# Patient Record
Sex: FEMALE | Race: WHITE | NOT HISPANIC OR LATINO | Employment: FULL TIME | URBAN - METROPOLITAN AREA
[De-identification: names, ages, dates, MRNs, and addresses within clinical notes are randomized per-mention and may not be internally consistent; named-entity substitution may affect disease eponyms.]

---

## 2019-01-30 ENCOUNTER — OFFICE VISIT (OUTPATIENT)
Dept: OBSTETRICS AND GYNECOLOGY | Facility: CLINIC | Age: 44
End: 2019-01-30

## 2019-01-30 VITALS
HEIGHT: 63 IN | BODY MASS INDEX: 20.38 KG/M2 | WEIGHT: 115 LBS | SYSTOLIC BLOOD PRESSURE: 121 MMHG | DIASTOLIC BLOOD PRESSURE: 77 MMHG | HEART RATE: 109 BPM

## 2019-01-30 DIAGNOSIS — Z72.0 TOBACCO USE: ICD-10-CM

## 2019-01-30 DIAGNOSIS — R87.810 CERVICAL HIGH RISK HPV (HUMAN PAPILLOMAVIRUS) TEST POSITIVE: Primary | ICD-10-CM

## 2019-01-30 PROCEDURE — 99203 OFFICE O/P NEW LOW 30 MIN: CPT | Performed by: OBSTETRICS & GYNECOLOGY

## 2019-01-30 RX ORDER — CHLORAL HYDRATE 500 MG
CAPSULE ORAL
COMMUNITY
End: 2019-12-23

## 2019-01-30 RX ORDER — ASPIRIN 81 MG/1
TABLET, DELAYED RELEASE ORAL
COMMUNITY
Start: 2019-01-11 | End: 2019-12-23

## 2019-01-30 NOTE — PATIENT INSTRUCTIONS
Steps to Quit Smoking  Smoking tobacco can be harmful to your health and can affect almost every organ in your body. Smoking puts you, and those around you, at risk for developing many serious chronic diseases. Quitting smoking is difficult, but it is one of the best things that you can do for your health. It is never too late to quit.  What are the benefits of quitting smoking?  When you quit smoking, you lower your risk of developing serious diseases and conditions, such as:  · Lung cancer or lung disease, such as COPD.  · Heart disease.  · Stroke.  · Heart attack.  · Infertility.  · Osteoporosis and bone fractures.    Additionally, symptoms such as coughing, wheezing, and shortness of breath may get better when you quit. You may also find that you get sick less often because your body is stronger at fighting off colds and infections. If you are pregnant, quitting smoking can help to reduce your chances of having a baby of low birth weight.  How do I get ready to quit?  When you decide to quit smoking, create a plan to make sure that you are successful. Before you quit:  · Pick a date to quit. Set a date within the next two weeks to give you time to prepare.  · Write down the reasons why you are quitting. Keep this list in places where you will see it often, such as on your bathroom mirror or in your car or wallet.  · Identify the people, places, things, and activities that make you want to smoke (triggers) and avoid them. Make sure to take these actions:  ? Throw away all cigarettes at home, at work, and in your car.  ? Throw away smoking accessories, such as ashtrays and lighters.  ? Clean your car and make sure to empty the ashtray.  ? Clean your home, including curtains and carpets.  · Tell your family, friends, and coworkers that you are quitting. Support from your loved ones can make quitting easier.  · Talk with your health care provider about your options for quitting smoking.  · Find out what  treatment options are covered by your health insurance.    What strategies can I use to quit smoking?  Talk with your healthcare provider about different strategies to quit smoking. Some strategies include:  · Quitting smoking altogether instead of gradually lessening how much you smoke over a period of time. Research shows that quitting “cold turkey” is more successful than gradually quitting.  · Attending in-person counseling to help you build problem-solving skills. You are more likely to have success in quitting if you attend several counseling sessions. Even short sessions of 10 minutes can be effective.  · Finding resources and support systems that can help you to quit smoking and remain smoke-free after you quit. These resources are most helpful when you use them often. They can include:  ? Online chats with a counselor.  ? Telephone quitlines.  ? Printed self-help materials.  ? Support groups or group counseling.  ? Text messaging programs.  ? Mobile phone applications.  · Taking medicines to help you quit smoking. (If you are pregnant or breastfeeding, talk with your health care provider first.) Some medicines contain nicotine and some do not. Both types of medicines help with cravings, but the medicines that include nicotine help to relieve withdrawal symptoms. Your health care provider may recommend:  ? Nicotine patches, gum, or lozenges.  ? Nicotine inhalers or sprays.  ? Non-nicotine medicine that is taken by mouth.    Talk with your health care provider about combining strategies, such as taking medicines while you are also receiving in-person counseling. Using these two strategies together makes you more likely to succeed in quitting than if you used either strategy on its own.  If you are pregnant or breastfeeding, talk with your health care provider about finding counseling or other support strategies to quit smoking. Do not take medicine to help you quit smoking unless told to do so by your health  care provider.  What things can I do to make it easier to quit?  Quitting smoking might feel overwhelming at first, but there is a lot that you can do to make it easier. Take these important actions:  · Reach out to your family and friends and ask that they support and encourage you during this time. Call telephone quitlines, reach out to support groups, or work with a counselor for support.  · Ask people who smoke to avoid smoking around you.  · Avoid places that trigger you to smoke, such as bars, parties, or smoke-break areas at work.  · Spend time around people who do not smoke.  · Lessen stress in your life, because stress can be a smoking trigger for some people. To lessen stress, try:  ? Exercising regularly.  ? Deep-breathing exercises.  ? Yoga.  ? Meditating.  ? Performing a body scan. This involves closing your eyes, scanning your body from head to toe, and noticing which parts of your body are particularly tense. Purposefully relax the muscles in those areas.  · Download or purchase mobile phone or tablet apps (applications) that can help you stick to your quit plan by providing reminders, tips, and encouragement. There are many free apps, such as QuitGuide from the CDC (Centers for Disease Control and Prevention). You can find other support for quitting smoking (smoking cessation) through smokefree.gov and other websites.    How will I feel when I quit smoking?  Within the first 24 hours of quitting smoking, you may start to feel some withdrawal symptoms. These symptoms are usually most noticeable 2-3 days after quitting, but they usually do not last beyond 2-3 weeks. Changes or symptoms that you might experience include:  · Mood swings.  · Restlessness, anxiety, or irritation.  · Difficulty concentrating.  · Dizziness.  · Strong cravings for sugary foods in addition to nicotine.  · Mild weight gain.  · Constipation.  · Nausea.  · Coughing or a sore throat.  · Changes in how your medicines work in your  body.  · A depressed mood.  · Difficulty sleeping (insomnia).    After the first 2-3 weeks of quitting, you may start to notice more positive results, such as:  · Improved sense of smell and taste.  · Decreased coughing and sore throat.  · Slower heart rate.  · Lower blood pressure.  · Clearer skin.  · The ability to breathe more easily.  · Fewer sick days.    Quitting smoking is very challenging for most people. Do not get discouraged if you are not successful the first time. Some people need to make many attempts to quit before they achieve long-term success. Do your best to stick to your quit plan, and talk with your health care provider if you have any questions or concerns.  This information is not intended to replace advice given to you by your health care provider. Make sure you discuss any questions you have with your health care provider.  Document Released: 12/12/2002 Document Revised: 08/15/2017 Document Reviewed: 05/03/2016  [employer] Interactive Patient Education © 2018 [employer]

## 2019-01-30 NOTE — PROGRESS NOTES
"Elisa Mcdaniel is a 43 y.o. female.     History of Present Illness     43 y.o.    Had Pap at PCP was NIL HPV +, 16/18 negative  Last pap for about 4 years  No prior history of abnormal   Hysterectomy -  - Dr. Brown     Past Medical History:   Diagnosis Date   • Hyperlipidemia      Past Surgical History:   Procedure Laterality Date   •  SECTION     • LAPAROSCOPIC ASSISTED VAGINAL HYSTERECTOMY SALPINGO OOPHORECTOMY       meds _ ASA 81 mg     No Known Allergies    Social History     Tobacco Use   • Smoking status: Current Some Day Smoker     Types: Cigarettes   • Smokeless tobacco: Never Used   Substance Use Topics   • Alcohol use: Yes     Comment: Socially   • Drug use: No     Family History   Problem Relation Age of Onset   • Breast cancer Cousin    • Ovarian cancer Neg Hx    • Uterine cancer Neg Hx    • Colon cancer Neg Hx    • Deep vein thrombosis Neg Hx    • Pulmonary embolism Neg Hx          Review of Systems   Constitutional: Negative for chills and fever.   Gastrointestinal: Negative for abdominal pain, constipation and diarrhea.   Genitourinary: Negative for pelvic pain, vaginal bleeding and vaginal discharge.   All other systems reviewed and are negative.      Objective    /77   Pulse 109   Ht 158.8 cm (62.5\")   Wt 52.2 kg (115 lb)   Breastfeeding? No   BMI 20.70 kg/m²   Physical Exam   Constitutional: She is oriented to person, place, and time. She appears well-developed and well-nourished. No distress. She is not obese.  HENT:   Head: Normocephalic and atraumatic.   Eyes: Conjunctivae are normal. Right eye exhibits no discharge. Left eye exhibits no discharge.   Neck: Normal range of motion. Neck supple. No thyromegaly present.   Cardiovascular: Normal rate, regular rhythm and normal heart sounds.   No murmur heard.  Pulmonary/Chest: Effort normal and breath sounds normal. No respiratory distress.   Abdominal: Soft. Bowel sounds are normal. She exhibits no distension. " There is no tenderness.   Genitourinary: Vagina normal. Pelvic exam was performed with patient supine. There is no lesion or Bartholin's cyst on the right labia. There is no lesion or Bartholin's cyst on the left labia. Uterus is absent.   Cervix is parous. Cervix does not exhibit motion tenderness or lesion. Right adnexum displays no mass, no tenderness and no fullness. Left adnexum displays no mass, no tenderness and no fullness. No bleeding in the vagina. No vaginal discharge found.   Musculoskeletal: Normal range of motion. She exhibits no edema.   Lymphadenopathy:     She has no cervical adenopathy.        Right: No inguinal adenopathy present.        Left: No inguinal adenopathy present.   Neurological: She is alert and oriented to person, place, and time.   Skin: Skin is warm and dry. No rash noted.   Psychiatric: She has a normal mood and affect. Her behavior is normal. Judgment and thought content normal.         Assessment/Plan   Problems Addressed this Visit     None      Visit Diagnoses     Cervical high risk HPV (human papillomavirus) test positive    -  Primary    Tobacco use            Pap NIL - HPV positive in November, 2018   16/18 were negative  Reviewed ASCCP guidelines along with prior pap on testing showing NIL HPV negative in 2014   Per recommendations she is best to do Co-Test in November, 2019 with us and follow up based on these results.     - Does smoke   Discussed quitting as a mechanism to improve outcomes here.   Tobacco abuse addressed    1) Increases risk for heart disease, COPD and lung cancer   2) many ways to stop including hypnosis, cold turkey, weaning but if interested in Patches, nicotine gum, or medications like zyban and Chantix will need to see PCP  3) Recommend weaning as ideal way to try and reduce risk to stop   Typically encourage to set goals every two weeks with a reduction by 50% in use of tobacco. Once down to a few a day, set quit day in the same manor.   4) [place] quit  now is a resource available to all.     Pola Ingram MD  1/30/2019  3:04 PM    ]

## 2019-12-23 ENCOUNTER — OFFICE VISIT (OUTPATIENT)
Dept: OBSTETRICS AND GYNECOLOGY | Facility: CLINIC | Age: 44
End: 2019-12-23

## 2019-12-23 VITALS
DIASTOLIC BLOOD PRESSURE: 73 MMHG | HEART RATE: 87 BPM | BODY MASS INDEX: 22.63 KG/M2 | WEIGHT: 123 LBS | HEIGHT: 62 IN | SYSTOLIC BLOOD PRESSURE: 115 MMHG

## 2019-12-23 DIAGNOSIS — Z01.419 ENCOUNTER FOR GYNECOLOGICAL EXAMINATION WITHOUT ABNORMAL FINDING: Primary | ICD-10-CM

## 2019-12-23 DIAGNOSIS — R87.810 CERVICAL HIGH RISK HPV (HUMAN PAPILLOMAVIRUS) TEST POSITIVE: ICD-10-CM

## 2019-12-23 DIAGNOSIS — Z72.0 TOBACCO USE: ICD-10-CM

## 2019-12-23 PROCEDURE — 99396 PREV VISIT EST AGE 40-64: CPT | Performed by: OBSTETRICS & GYNECOLOGY

## 2019-12-23 NOTE — PROGRESS NOTES
"GYN Annual Exam     CC- Here for annual exam.     Anahi Mcdaniel is a 44 y.o. female who presents for annual well woman exam. Periods are absent due to Hysterectomy.     OB History        2    Para   2    Term                AB        Living   2       SAB        TAB        Ectopic        Molar        Multiple        Live Births   2                Current contraception: status post hysterectomy  History of abnormal Pap smear: yes - no treatment required  Family history of uterine, colon or ovarian cancer: no  History of abnormal mammogram: no  Family history of breast cancer: no  Last Pap : 2018 NL HPV pos, neg 16/18  Last Mammo: 2 yrs ago  Last Colonoscopy: never    Past Medical History:   Diagnosis Date   • Hyperlipidemia        Past Surgical History:   Procedure Laterality Date   •  SECTION     • LAPAROSCOPIC ASSISTED VAGINAL HYSTERECTOMY SALPINGO OOPHORECTOMY         No current outpatient medications on file.    No Known Allergies    Social History     Tobacco Use   • Smoking status: Current Some Day Smoker     Types: Cigarettes   • Smokeless tobacco: Never Used   Substance Use Topics   • Alcohol use: Yes     Comment: Socially   • Drug use: No       Family History   Problem Relation Age of Onset   • Breast cancer Cousin    • Ovarian cancer Neg Hx    • Uterine cancer Neg Hx    • Colon cancer Neg Hx    • Deep vein thrombosis Neg Hx    • Pulmonary embolism Neg Hx        Review of Systems   Constitutional: Negative for chills and fever.   Gastrointestinal: Negative for abdominal pain.   Genitourinary: Negative for dysuria, pelvic pain, vaginal bleeding and vaginal discharge.   All other systems reviewed and are negative.      /73   Pulse 87   Ht 157.5 cm (62\")   Wt 55.8 kg (123 lb)   Breastfeeding No   BMI 22.50 kg/m²     Physical Exam   Constitutional: She is oriented to person, place, and time. She appears well-developed and well-nourished. No distress. She is not obese.  HENT: "   Head: Normocephalic and atraumatic.   Eyes: Conjunctivae are normal. Right eye exhibits no discharge. Left eye exhibits no discharge.   Neck: Normal range of motion. Neck supple. No thyromegaly present.   Cardiovascular: Normal rate, regular rhythm and normal heart sounds.   No murmur heard.  Pulmonary/Chest: Effort normal and breath sounds normal. No respiratory distress. Right breast exhibits no inverted nipple, no mass and no nipple discharge. Left breast exhibits no inverted nipple, no mass and no nipple discharge.   Abdominal: Soft. Bowel sounds are normal. She exhibits no distension. There is no tenderness.   Genitourinary: Vagina normal and cervix normal. Pelvic exam was performed with patient supine. There is no lesion or Bartholin's cyst on the right labia. There is no lesion or Bartholin's cyst on the left labia. Uterus is absent. Cervix does not exhibit motion tenderness or friability. Right adnexum displays no mass, no tenderness and no fullness. Left adnexum displays no mass, no tenderness and no fullness. No bleeding in the vagina. No vaginal discharge found.   Musculoskeletal: Normal range of motion. She exhibits no edema.   Lymphadenopathy:     She has no cervical adenopathy.        Right: No inguinal adenopathy present.        Left: No inguinal adenopathy present.   Neurological: She is alert and oriented to person, place, and time.   Skin: Skin is warm and dry. No rash noted.   Psychiatric: She has a normal mood and affect. Her behavior is normal. Judgment and thought content normal.            Assessment     1) GYN annual well woman exam.   2) Pap last year HPV +  Repeating today   3) Tobacco use.   Tobacco abuse addressed    1) Increases risk for heart disease, COPD and lung cancer   2) many ways to stop including hypnosis, cold turkey, weaning but if interested in patches, nicotine gum, or medications like zyban and Chantix will need to see PCP  3) Recommend weaning as ideal way to try and  reduce risk to stop   Typically encourage to set goals every two weeks with a reduction by 50% in use of tobacco. Once down to a few a day, set quit day in the same manor.              Plan     1) Breast Health - Clinical breast exam yearly, Mammogram and ACS, and Self breast awareness monthly  2) Pap - updated today   3) Smoking status- cessation encouraged   4) Activity recommends - Adult 150-300 min/week of multi-component physical activities that include balance training, aerobic and physical strengthening.  Disabled or ill adults should still try to fulfill these requirements, with modifications based on their conditions.  5) Follow up prn and one year.       Pola Ingram MD   12/23/2019  12:46 PM

## 2019-12-27 LAB
CYTOLOGIST CVX/VAG CYTO: NORMAL
CYTOLOGY CVX/VAG DOC CYTO: NORMAL
CYTOLOGY CVX/VAG DOC THIN PREP: NORMAL
DX ICD CODE: NORMAL
HIV 1 & 2 AB SER-IMP: NORMAL
HPV I/H RISK 1 DNA CVX QL PROBE+SIG AMP: NEGATIVE
OTHER STN SPEC: NORMAL
STAT OF ADQ CVX/VAG CYTO-IMP: NORMAL